# Patient Record
Sex: FEMALE | Race: OTHER | HISPANIC OR LATINO | URBAN - METROPOLITAN AREA
[De-identification: names, ages, dates, MRNs, and addresses within clinical notes are randomized per-mention and may not be internally consistent; named-entity substitution may affect disease eponyms.]

---

## 2023-11-19 ENCOUNTER — EMERGENCY (EMERGENCY)
Facility: HOSPITAL | Age: 28
LOS: 1 days | Discharge: HOME | End: 2023-11-19
Attending: EMERGENCY MEDICINE
Payer: MEDICAID

## 2023-11-19 VITALS
HEART RATE: 65 BPM | OXYGEN SATURATION: 98 % | SYSTOLIC BLOOD PRESSURE: 100 MMHG | DIASTOLIC BLOOD PRESSURE: 64 MMHG | RESPIRATION RATE: 20 BRPM | TEMPERATURE: 98 F

## 2023-11-19 VITALS
OXYGEN SATURATION: 98 % | DIASTOLIC BLOOD PRESSURE: 87 MMHG | TEMPERATURE: 99 F | HEART RATE: 98 BPM | WEIGHT: 143.52 LBS | HEIGHT: 63 IN | RESPIRATION RATE: 18 BRPM | SYSTOLIC BLOOD PRESSURE: 121 MMHG

## 2023-11-19 PROCEDURE — 99284 EMERGENCY DEPT VISIT MOD MDM: CPT

## 2023-11-19 PROCEDURE — 99283 EMERGENCY DEPT VISIT LOW MDM: CPT

## 2023-11-19 RX ORDER — LIDOCAINE AND PRILOCAINE CREAM 25; 25 MG/G; MG/G
1 CREAM TOPICAL ONCE
Refills: 0 | Status: COMPLETED | OUTPATIENT
Start: 2023-11-19 | End: 2023-11-19

## 2023-11-19 RX ORDER — MINERAL OIL
133 OIL (ML) MISCELLANEOUS ONCE
Refills: 0 | Status: COMPLETED | OUTPATIENT
Start: 2023-11-19 | End: 2023-11-19

## 2023-11-19 RX ORDER — ACETAMINOPHEN 500 MG
650 TABLET ORAL ONCE
Refills: 0 | Status: COMPLETED | OUTPATIENT
Start: 2023-11-19 | End: 2023-11-19

## 2023-11-19 RX ORDER — LACTULOSE 10 G/15ML
20 SOLUTION ORAL ONCE
Refills: 0 | Status: COMPLETED | OUTPATIENT
Start: 2023-11-19 | End: 2023-11-19

## 2023-11-19 RX ORDER — MULTIVIT WITH MIN/MFOLATE/K2 340-15/3 G
296 POWDER (GRAM) ORAL ONCE
Refills: 0 | Status: DISCONTINUED | OUTPATIENT
Start: 2023-11-19 | End: 2023-11-19

## 2023-11-19 RX ADMIN — LACTULOSE 20 GRAM(S): 10 SOLUTION ORAL at 16:09

## 2023-11-19 RX ADMIN — Medication 650 MILLIGRAM(S): at 15:14

## 2023-11-19 RX ADMIN — Medication 133 MILLILITER(S): at 15:13

## 2023-11-19 RX ADMIN — LIDOCAINE AND PRILOCAINE CREAM 1 APPLICATION(S): 25; 25 CREAM TOPICAL at 15:15

## 2023-11-19 NOTE — ED ADULT TRIAGE NOTE - DIRECT TO ROOM CARE INITIATED:
Nursing Transfer Note      1/3/2020     Transfer To: [address]    Transfer via bed    Transfer with 2L nasal cannula, cardiac monitoring    Transported by MARIAM August RN and transport tech    Medicines sent: n/a    Chart send with patient: Yes    Notified: Pt to notify family     Upon arrival to floor: cardiac monitor applied, patient oriented to room, call bell in reach and bed in lowest position    Transported without issue.  Pt belongings sent with patient.  Bedside report given to Telemetry RN.    
19-Nov-2023 13:49

## 2023-11-19 NOTE — ED PROVIDER NOTE - PROGRESS NOTE DETAILS
Patient had BM in Emergency Department prior to all medications, is comfortable trying rest of plan at home, will discharge.

## 2023-11-19 NOTE — ED ADULT NURSE NOTE - CCCP TRG CHIEF CMPLNT
NEEDS  METOPROLOL SUCCINATE 25MG   TAKE ONE AND ONE HALF PER DAY   #45  [facility]  [phone] pain, rectal

## 2023-11-19 NOTE — ED ADULT NURSE NOTE - NSFALLUNIVINTERV_ED_ALL_ED
Bed/Stretcher in lowest position, wheels locked, appropriate side rails in place/Call bell, personal items and telephone in reach/Instruct patient to call for assistance before getting out of bed/chair/stretcher/Non-slip footwear applied when patient is off stretcher/[place] to call system/Physically safe environment - no spills, clutter or unnecessary equipment/Purposeful proactive rounding/Room/bathroom lighting operational, light cord in reach

## 2023-11-19 NOTE — ED PROVIDER NOTE - PHYSICAL EXAMINATION
Exam:  General: Patient well appearing, vital signs within normal limits  HEENT: airway patent with moist mucous membranes  Cardiac: RRR S1/S2 with strong peripheral pulses  Respiratory: lungs clear without respiratory distress  GI: abdomen soft, non tender, non distended, no external hemorrhoids or anal fissures noted  Neuro: no gross neurologic deficits  Skin: warm, well perfused  Psych: normal mood and affect

## 2023-11-19 NOTE — ED PROVIDER NOTE - NSFOLLOWUPINSTRUCTIONS_ED_ALL_ED_FT
Thank you for choosing [facility] for your healthcare.    You were seen in the Emergency Department for constipation and discomfort with passing stool.  You were able to pass a small bowel movement here in the emergency department and felt better.  We are discharging you with medications to help you continue be able to pass stool at home.  We are including the information for our GI doctors to get evaluated further chronic issues.  We also recommend that you try a high-fiber diet as this can help ease the bowel movements.  Please return to the emergency room for any further emergent or concerning medical issues.

## 2023-11-19 NOTE — ED PROVIDER NOTE - PATIENT PORTAL LINK FT
You can access the FollowMyHealth Patient Portal offered by [facility] by registering at the following website: [email or web address]. By joining [employer]’s FollowMyHealth portal, you will also be able to view your health information using other applications (apps) compatible with our system.

## 2023-11-19 NOTE — ED PROVIDER NOTE - OBJECTIVE STATEMENT
28-year-old woman with a history of baseline constipation presenting with tenesmus/pain in rectum with trying to pass stool beginning this morning.  Reporting pain gets severe she feels like she is going to pass out but has not lost consciousness.  No associated chest pains.  Does have 1 other episode similar to this in the past but did not seek medical care at that time.  Tried enema at home and small amount of stool was able to be passed but still having symptoms.  Denying any abdominal pains.  No prior abdominal surgeries.

## 2023-11-19 NOTE — ED PROVIDER NOTE - NSFOLLOWUPCLINICS_GEN_ALL_ED_FT
[facility]  Gastroenterology  [address] [place] [address]  Phone: [phone]  Fax: [phone]

## 2023-11-19 NOTE — ED PROVIDER NOTE - CLINICAL SUMMARY MEDICAL DECISION MAKING FREE TEXT BOX
Patient presenting with painful need to stool without obvious cause on exam, benign abdominal exam, discussed with patient will trial APAP/topical EMLA for symptom relief, mineral oil enema and mag citrate for constipation and re-evaluate.  Regarding near syncopal symptoms likely vasovagal, suspicion for cardiac cause very low.